# Patient Record
Sex: FEMALE | Race: BLACK OR AFRICAN AMERICAN | NOT HISPANIC OR LATINO | URBAN - NONMETROPOLITAN AREA
[De-identification: names, ages, dates, MRNs, and addresses within clinical notes are randomized per-mention and may not be internally consistent; named-entity substitution may affect disease eponyms.]

---

## 2020-05-30 ENCOUNTER — IMPORTED ENCOUNTER (OUTPATIENT)
Dept: URBAN - NONMETROPOLITAN AREA CLINIC 1 | Facility: CLINIC | Age: 60
End: 2020-05-30

## 2020-05-30 PROBLEM — H25.13: Noted: 2020-05-30

## 2020-05-30 PROBLEM — H52.4: Noted: 2020-05-30

## 2020-05-30 PROCEDURE — 92004 COMPRE OPH EXAM NEW PT 1/>: CPT

## 2020-05-30 PROCEDURE — 92015 DETERMINE REFRACTIVE STATE: CPT

## 2020-05-30 NOTE — PATIENT DISCUSSION
Hyperopia / Presbyopia OU - Discussed diagnosis in detail with patient- New glasses RX given today- Continue to monitor- RTC 1 year complete [place] OU- Discussed diagnosis in detail with patient- Discussed signs and symptoms of progression- Discussed UV protection- No treatment needed at this time - Continue to monitor

## 2022-04-15 ASSESSMENT — VISUAL ACUITY
OU_CC: 20/50
OS_CC: 20/63
OS_PH: 20/32
OD_PH: 20/32-
OD_CC: 20/125-

## 2022-04-15 ASSESSMENT — TONOMETRY
OS_IOP_MMHG: 16
OD_IOP_MMHG: 15